# Patient Record
Sex: FEMALE | Race: WHITE | NOT HISPANIC OR LATINO
[De-identification: names, ages, dates, MRNs, and addresses within clinical notes are randomized per-mention and may not be internally consistent; named-entity substitution may affect disease eponyms.]

---

## 2019-06-20 ENCOUNTER — RECORD ABSTRACTING (OUTPATIENT)
Age: 29
End: 2019-06-20

## 2019-06-20 DIAGNOSIS — Z87.42 PERSONAL HISTORY OF OTHER DISEASES OF THE FEMALE GENITAL TRACT: ICD-10-CM

## 2019-06-20 PROBLEM — Z00.00 ENCOUNTER FOR PREVENTIVE HEALTH EXAMINATION: Status: ACTIVE | Noted: 2019-06-20

## 2019-06-20 LAB — CYTOLOGY CVX/VAG DOC THIN PREP: NORMAL

## 2020-03-26 ENCOUNTER — LABORATORY RESULT (OUTPATIENT)
Age: 30
End: 2020-03-26

## 2020-03-26 ENCOUNTER — APPOINTMENT (OUTPATIENT)
Dept: OBGYN | Facility: CLINIC | Age: 30
End: 2020-03-26
Payer: COMMERCIAL

## 2020-03-26 VITALS — WEIGHT: 159 LBS | SYSTOLIC BLOOD PRESSURE: 120 MMHG | DIASTOLIC BLOOD PRESSURE: 80 MMHG

## 2020-03-26 PROCEDURE — 0500F INITIAL PRENATAL CARE VISIT: CPT

## 2020-03-26 PROCEDURE — 36415 COLL VENOUS BLD VENIPUNCTURE: CPT

## 2020-03-26 PROCEDURE — 59425 ANTEPARTUM CARE ONLY: CPT

## 2020-03-26 NOTE — CHIEF COMPLAINT
[Initial Prenatal Visit] : initial prenatal visit [FreeTextEntry1] : Initial office visit.\par LMP:11/11/2019\par ZULLY:8/17/2020\par [id number]

## 2020-03-26 NOTE — HISTORY OF PRESENT ILLNESS
[Definite:  ___ (Date)] : the last menstrual period was [unfilled] [Normal Amount/Duration] : was of a normal amount and duration [Regular Cycle Intervals] : periods have been regular [Menarche Age: ____] : age at menarche was [unfilled] [Menstrual Cramps] : menstrual cramps [Spotting Between  Menses] : no spotting between menses [On BCP at conception] : the patient was not on BCP at conception [Contraception] : does not use contraception

## 2020-03-27 ENCOUNTER — LABORATORY RESULT (OUTPATIENT)
Age: 30
End: 2020-03-27

## 2020-03-30 ENCOUNTER — TRANSCRIPTION ENCOUNTER (OUTPATIENT)
Age: 30
End: 2020-03-30

## 2020-03-30 LAB
BACTERIA UR CULT: NORMAL
BASOPHILS # BLD AUTO: 0.04 K/UL
BASOPHILS NFR BLD AUTO: 0.5 %
C TRACH RRNA SPEC QL NAA+PROBE: NOT DETECTED
CMV IGG SERPL QL: <0.2 U/ML
CMV IGG SERPL-IMP: NEGATIVE
CMV IGM SERPL QL: <8 AU/ML
CMV IGM SERPL QL: NEGATIVE
EOSINOPHIL # BLD AUTO: 0.03 K/UL
EOSINOPHIL NFR BLD AUTO: 0.4 %
HBV SURFACE AG SER QL: NONREACTIVE
HCT VFR BLD CALC: 43.9 %
HCV AB SER QL: NONREACTIVE
HCV S/CO RATIO: 0.1 S/CO
HGB BLD-MCNC: 12.6 G/DL
HIV1+2 AB SPEC QL IA.RAPID: NONREACTIVE
HSV 1+2 IGG SER IA-IMP: NEGATIVE
HSV 1+2 IGG SER IA-IMP: POSITIVE
HSV1 IGG SER QL: >62.2 INDEX
HSV2 IGG SER QL: 0.13 INDEX
IMM GRANULOCYTES NFR BLD AUTO: 0.4 %
LYMPHOCYTES # BLD AUTO: 1.82 K/UL
LYMPHOCYTES NFR BLD AUTO: 21.5 %
MAN DIFF?: NORMAL
MCHC RBC-ENTMCNC: 28.7 GM/DL
MCHC RBC-ENTMCNC: 31.7 PG
MCV RBC AUTO: 110.6 FL
MEV IGG FLD QL IA: >300 AU/ML
MEV IGG+IGM SER-IMP: POSITIVE
MONOCYTES # BLD AUTO: 0.48 K/UL
MONOCYTES NFR BLD AUTO: 5.7 %
N GONORRHOEA RRNA SPEC QL NAA+PROBE: NOT DETECTED
NEUTROPHILS # BLD AUTO: 6.08 K/UL
NEUTROPHILS NFR BLD AUTO: 71.5 %
PLATELET # BLD AUTO: 293 K/UL
RBC # BLD: 3.97 M/UL
RBC # FLD: 15.1 %
RUBV IGG FLD-ACNC: 2.9 INDEX
RUBV IGG SER-IMP: POSITIVE
SOURCE AMPLIFICATION: NORMAL
T GONDII AB SER-IMP: NEGATIVE
T GONDII AB SER-IMP: NEGATIVE
T GONDII IGG SER QL: <3 IU/ML
T GONDII IGM SER QL: <3 AU/ML
T PALLIDUM AB SER QL IA: NEGATIVE
VZV AB TITR SER: POSITIVE
VZV IGG SER IF-ACNC: 1360 INDEX
WBC # FLD AUTO: 8.48 K/UL

## 2020-03-31 ENCOUNTER — APPOINTMENT (OUTPATIENT)
Dept: OBGYN | Facility: CLINIC | Age: 30
End: 2020-03-31

## 2020-03-31 ENCOUNTER — APPOINTMENT (OUTPATIENT)
Dept: ANTEPARTUM | Facility: CLINIC | Age: 30
End: 2020-03-31
Payer: COMMERCIAL

## 2020-03-31 PROCEDURE — 76946 ECHO GUIDE FOR AMNIOCENTESIS: CPT

## 2020-03-31 PROCEDURE — 59000 AMNIOCENTESIS DIAGNOSTIC: CPT

## 2020-04-01 LAB
B19V IGG SER QL IA: 0.2 INDEX
B19V IGG+IGM SER-IMP: NEGATIVE
B19V IGG+IGM SER-IMP: NORMAL
B19V IGM FLD-ACNC: 0.1 INDEX
B19V IGM SER-ACNC: NEGATIVE
HGB A MFR BLD: 97.4 %
HGB A2 MFR BLD: 2.6 %
HGB FRACT BLD-IMP: NORMAL
MEV IGM SER QL: NEGATIVE

## 2020-04-07 ENCOUNTER — APPOINTMENT (OUTPATIENT)
Dept: ANTEPARTUM | Facility: CLINIC | Age: 30
End: 2020-04-07
Payer: COMMERCIAL

## 2020-04-07 PROCEDURE — 76817 TRANSVAGINAL US OBSTETRIC: CPT

## 2020-04-07 PROCEDURE — 76805 OB US >/= 14 WKS SNGL FETUS: CPT

## 2020-04-17 ENCOUNTER — APPOINTMENT (OUTPATIENT)
Dept: OBGYN | Facility: CLINIC | Age: 30
End: 2020-04-17
Payer: COMMERCIAL

## 2020-04-17 VITALS — WEIGHT: 163 LBS | DIASTOLIC BLOOD PRESSURE: 60 MMHG | SYSTOLIC BLOOD PRESSURE: 100 MMHG

## 2020-04-17 PROCEDURE — 0502F SUBSEQUENT PRENATAL CARE: CPT

## 2020-04-27 DIAGNOSIS — O23.40 UNSPECIFIED INFECTION OF URINARY TRACT IN PREGNANCY, UNSPECIFIED TRIMESTER: ICD-10-CM

## 2020-04-30 ENCOUNTER — APPOINTMENT (OUTPATIENT)
Dept: OBGYN | Facility: CLINIC | Age: 30
End: 2020-04-30
Payer: COMMERCIAL

## 2020-04-30 VITALS — DIASTOLIC BLOOD PRESSURE: 70 MMHG | SYSTOLIC BLOOD PRESSURE: 100 MMHG | WEIGHT: 168 LBS

## 2020-04-30 PROCEDURE — 0502F SUBSEQUENT PRENATAL CARE: CPT

## 2020-05-04 LAB — BACTERIA UR CULT: NORMAL

## 2020-05-07 ENCOUNTER — APPOINTMENT (OUTPATIENT)
Dept: OBGYN | Facility: CLINIC | Age: 30
End: 2020-05-07
Payer: COMMERCIAL

## 2020-05-07 VITALS — WEIGHT: 169 LBS | DIASTOLIC BLOOD PRESSURE: 60 MMHG | SYSTOLIC BLOOD PRESSURE: 100 MMHG

## 2020-05-07 PROCEDURE — 36415 COLL VENOUS BLD VENIPUNCTURE: CPT

## 2020-05-07 PROCEDURE — 0502F SUBSEQUENT PRENATAL CARE: CPT

## 2020-05-08 LAB
BASOPHILS # BLD AUTO: 0.02 K/UL
BASOPHILS NFR BLD AUTO: 0.2 %
EOSINOPHIL # BLD AUTO: 0.04 K/UL
EOSINOPHIL NFR BLD AUTO: 0.4 %
GLUCOSE 1H P 50 G GLC PO SERPL-MCNC: 71 MG/DL
HCT VFR BLD CALC: 38.4 %
HGB BLD-MCNC: 11.9 G/DL
IMM GRANULOCYTES NFR BLD AUTO: 0.4 %
LYMPHOCYTES # BLD AUTO: 1.86 K/UL
LYMPHOCYTES NFR BLD AUTO: 19.3 %
MAN DIFF?: NORMAL
MCHC RBC-ENTMCNC: 31 GM/DL
MCHC RBC-ENTMCNC: 32 PG
MCV RBC AUTO: 103.2 FL
MONOCYTES # BLD AUTO: 0.76 K/UL
MONOCYTES NFR BLD AUTO: 7.9 %
NEUTROPHILS # BLD AUTO: 6.93 K/UL
NEUTROPHILS NFR BLD AUTO: 71.8 %
PLATELET # BLD AUTO: 237 K/UL
RBC # BLD: 3.72 M/UL
RBC # FLD: 13.4 %
WBC # FLD AUTO: 9.65 K/UL

## 2020-05-11 LAB — BACTERIA UR CULT: NORMAL

## 2020-05-25 RX ORDER — NITROFURANTOIN (MONOHYDRATE/MACROCRYSTALS) 25; 75 MG/1; MG/1
100 CAPSULE ORAL
Qty: 14 | Refills: 0 | Status: ACTIVE | COMMUNITY
Start: 2020-04-27 | End: 1900-01-01

## 2020-05-25 RX ORDER — TERCONAZOLE 4 MG/G
0.4 CREAM VAGINAL
Qty: 1 | Refills: 0 | Status: ACTIVE | COMMUNITY
Start: 2020-05-25 | End: 1900-01-01

## 2020-05-28 ENCOUNTER — APPOINTMENT (OUTPATIENT)
Dept: OBGYN | Facility: CLINIC | Age: 30
End: 2020-05-28
Payer: COMMERCIAL

## 2020-05-28 VITALS — WEIGHT: 174 LBS | SYSTOLIC BLOOD PRESSURE: 120 MMHG | DIASTOLIC BLOOD PRESSURE: 70 MMHG

## 2020-05-28 PROCEDURE — 0502F SUBSEQUENT PRENATAL CARE: CPT

## 2020-06-02 ENCOUNTER — APPOINTMENT (OUTPATIENT)
Dept: ANTEPARTUM | Facility: CLINIC | Age: 30
End: 2020-06-02
Payer: COMMERCIAL

## 2020-06-02 PROCEDURE — 76819 FETAL BIOPHYS PROFIL W/O NST: CPT

## 2020-06-02 PROCEDURE — 76816 OB US FOLLOW-UP PER FETUS: CPT

## 2020-06-11 ENCOUNTER — APPOINTMENT (OUTPATIENT)
Dept: OBGYN | Facility: CLINIC | Age: 30
End: 2020-06-11

## 2020-06-16 ENCOUNTER — APPOINTMENT (OUTPATIENT)
Dept: ANTEPARTUM | Facility: CLINIC | Age: 30
End: 2020-06-16

## 2021-04-30 ENCOUNTER — TRANSCRIPTION ENCOUNTER (OUTPATIENT)
Age: 31
End: 2021-04-30

## 2021-07-21 ENCOUNTER — TRANSCRIPTION ENCOUNTER (OUTPATIENT)
Age: 31
End: 2021-07-21

## 2021-07-29 ENCOUNTER — TRANSCRIPTION ENCOUNTER (OUTPATIENT)
Age: 31
End: 2021-07-29

## 2021-08-15 ENCOUNTER — TRANSCRIPTION ENCOUNTER (OUTPATIENT)
Age: 31
End: 2021-08-15

## 2021-08-29 ENCOUNTER — TRANSCRIPTION ENCOUNTER (OUTPATIENT)
Age: 31
End: 2021-08-29

## 2021-11-04 ENCOUNTER — TRANSCRIPTION ENCOUNTER (OUTPATIENT)
Age: 31
End: 2021-11-04

## 2021-12-16 ENCOUNTER — TRANSCRIPTION ENCOUNTER (OUTPATIENT)
Age: 31
End: 2021-12-16

## 2022-01-20 ENCOUNTER — TRANSCRIPTION ENCOUNTER (OUTPATIENT)
Age: 32
End: 2022-01-20

## 2022-02-18 ENCOUNTER — TRANSCRIPTION ENCOUNTER (OUTPATIENT)
Age: 32
End: 2022-02-18

## 2022-03-22 ENCOUNTER — TRANSCRIPTION ENCOUNTER (OUTPATIENT)
Age: 32
End: 2022-03-22

## 2022-05-24 ENCOUNTER — NON-APPOINTMENT (OUTPATIENT)
Age: 32
End: 2022-05-24

## 2022-06-07 ENCOUNTER — APPOINTMENT (OUTPATIENT)
Dept: OBGYN | Facility: CLINIC | Age: 32
End: 2022-06-07
Payer: COMMERCIAL

## 2022-06-07 ENCOUNTER — NON-APPOINTMENT (OUTPATIENT)
Age: 32
End: 2022-06-07

## 2022-06-07 ENCOUNTER — LABORATORY RESULT (OUTPATIENT)
Age: 32
End: 2022-06-07

## 2022-06-07 VITALS — WEIGHT: 169 LBS | HEART RATE: 77 BPM | SYSTOLIC BLOOD PRESSURE: 104 MMHG | DIASTOLIC BLOOD PRESSURE: 69 MMHG

## 2022-06-07 DIAGNOSIS — Z34.02 ENCOUNTER FOR SUPERVISION OF NORMAL FIRST PREGNANCY, SECOND TRIMESTER: ICD-10-CM

## 2022-06-07 PROCEDURE — 0500F INITIAL PRENATAL CARE VISIT: CPT

## 2022-06-07 RX ORDER — ESCITALOPRAM OXALATE 10 MG/1
10 TABLET ORAL
Qty: 60 | Refills: 0 | Status: ACTIVE | COMMUNITY
Start: 2021-12-22

## 2022-06-07 RX ORDER — BUPROPION HYDROCHLORIDE 150 MG/1
150 TABLET, FILM COATED, EXTENDED RELEASE ORAL
Qty: 30 | Refills: 0 | Status: ACTIVE | COMMUNITY
Start: 2022-04-14

## 2022-06-07 NOTE — HISTORY OF PRESENT ILLNESS
[Patient reported PAP Smear was normal] : Patient reported PAP Smear was normal [N] : Patient does not use contraception [Y] : Positive pregnancy history [Normal Amount/Duration] :  normal amount and duration [Currently Active] : currently active [Men] : men [No] : No [PapSmeardate] : 10/17 [LMPDate] : 04/11/2022 [PGxTotal] : 1 [[facility]] : 1 [FreeTextEntry1] : 04/11/2022

## 2022-06-09 LAB
ABO + RH PNL BLD: NORMAL
BACTERIA UR CULT: NORMAL
BASOPHILS # BLD AUTO: 0.06 K/UL
BASOPHILS NFR BLD AUTO: 0.8 %
BLD GP AB SCN SERPL QL: NORMAL
C TRACH RRNA SPEC QL NAA+PROBE: NOT DETECTED
CMV IGG SERPL QL: <0.2 U/ML
CMV IGG SERPL-IMP: NEGATIVE
CMV IGM SERPL QL: <8 AU/ML
CMV IGM SERPL QL: NEGATIVE
EOSINOPHIL # BLD AUTO: 0.39 K/UL
EOSINOPHIL NFR BLD AUTO: 5.2 %
HBV SURFACE AG SER QL: NONREACTIVE
HCT VFR BLD CALC: 41.8 %
HCV AB SER QL: NONREACTIVE
HCV S/CO RATIO: 0.15 S/CO
HGB A MFR BLD: 97 %
HGB A2 MFR BLD: 2.6 %
HGB BLD-MCNC: 13.1 G/DL
HGB F MFR BLD: 0.4 %
HGB FRACT BLD-IMP: NORMAL
HIV1+2 AB SPEC QL IA.RAPID: NONREACTIVE
HSV 1+2 IGG SER IA-IMP: NEGATIVE
HSV 1+2 IGG SER IA-IMP: POSITIVE
HSV1 IGG SER QL: 56.5 INDEX
HSV2 IGG SER QL: 0.46 INDEX
IMM GRANULOCYTES NFR BLD AUTO: 0.3 %
LYMPHOCYTES # BLD AUTO: 2.23 K/UL
LYMPHOCYTES NFR BLD AUTO: 29.6 %
MAN DIFF?: NORMAL
MCHC RBC-ENTMCNC: 30.3 PG
MCHC RBC-ENTMCNC: 31.3 GM/DL
MCV RBC AUTO: 96.5 FL
MEV IGG FLD QL IA: >300 AU/ML
MEV IGG+IGM SER-IMP: POSITIVE
MONOCYTES # BLD AUTO: 0.47 K/UL
MONOCYTES NFR BLD AUTO: 6.2 %
N GONORRHOEA RRNA SPEC QL NAA+PROBE: NOT DETECTED
NEUTROPHILS # BLD AUTO: 4.36 K/UL
NEUTROPHILS NFR BLD AUTO: 57.9 %
PLATELET # BLD AUTO: 296 K/UL
RBC # BLD: 4.33 M/UL
RBC # FLD: 14.2 %
RUBV IGG FLD-ACNC: 1.7 INDEX
RUBV IGG SER-IMP: POSITIVE
RUBV IGM FLD-ACNC: <20 AU/ML
SOURCE AMPLIFICATION: NORMAL
T GONDII AB SER-IMP: NEGATIVE
T GONDII AB SER-IMP: NEGATIVE
T GONDII IGG SER QL: <3 IU/ML
T GONDII IGM SER QL: <3 AU/ML
T PALLIDUM AB SER QL IA: NEGATIVE
TSH SERPL-ACNC: 2.3 UIU/ML
VZV AB TITR SER: POSITIVE
VZV IGG SER IF-ACNC: 1604 INDEX
VZV IGM SER IF-ACNC: <0.91 INDEX
WBC # FLD AUTO: 7.53 K/UL

## 2022-06-11 LAB
B19V IGG SER QL IA: 0.32 INDEX
B19V IGG+IGM SER-IMP: NEGATIVE
B19V IGG+IGM SER-IMP: NORMAL
B19V IGM FLD-ACNC: 0.07 INDEX
B19V IGM SER-ACNC: NEGATIVE
CYTOLOGY CVX/VAG DOC THIN PREP: NORMAL

## 2022-06-13 LAB — MEV IGM SER QL: <0.91 ISR

## 2022-06-16 LAB
HSV1 IGM SER QL: NEGATIVE
HSV2 AB FLD-ACNC: NEGATIVE

## 2022-06-17 ENCOUNTER — NON-APPOINTMENT (OUTPATIENT)
Age: 32
End: 2022-06-17

## 2022-06-27 ENCOUNTER — APPOINTMENT (OUTPATIENT)
Dept: OBGYN | Facility: CLINIC | Age: 32
End: 2022-06-27
Payer: COMMERCIAL

## 2022-06-27 VITALS
OXYGEN SATURATION: 97 % | SYSTOLIC BLOOD PRESSURE: 104 MMHG | HEART RATE: 100 BPM | WEIGHT: 166 LBS | DIASTOLIC BLOOD PRESSURE: 68 MMHG

## 2022-06-27 PROCEDURE — 0500F INITIAL PRENATAL CARE VISIT: CPT

## 2022-06-28 ENCOUNTER — NON-APPOINTMENT (OUTPATIENT)
Age: 32
End: 2022-06-28

## 2022-07-01 ENCOUNTER — NON-APPOINTMENT (OUTPATIENT)
Age: 32
End: 2022-07-01

## 2022-07-04 ENCOUNTER — NON-APPOINTMENT (OUTPATIENT)
Age: 32
End: 2022-07-04

## 2022-07-07 ENCOUNTER — ASOB RESULT (OUTPATIENT)
Age: 32
End: 2022-07-07

## 2022-07-07 ENCOUNTER — APPOINTMENT (OUTPATIENT)
Dept: ANTEPARTUM | Facility: CLINIC | Age: 32
End: 2022-07-07

## 2022-07-07 PROCEDURE — 76818 FETAL BIOPHYS PROFILE W/NST: CPT

## 2022-07-07 PROCEDURE — 76816 OB US FOLLOW-UP PER FETUS: CPT

## 2022-07-13 ENCOUNTER — APPOINTMENT (OUTPATIENT)
Dept: ANTEPARTUM | Facility: CLINIC | Age: 32
End: 2022-07-13

## 2022-07-13 ENCOUNTER — ASOB RESULT (OUTPATIENT)
Age: 32
End: 2022-07-13

## 2022-07-13 PROCEDURE — 59015 CHORION BIOPSY: CPT

## 2022-07-13 PROCEDURE — 76945 ECHO GUIDE VILLUS SAMPLING: CPT

## 2022-07-18 ENCOUNTER — APPOINTMENT (OUTPATIENT)
Dept: OBGYN | Facility: CLINIC | Age: 32
End: 2022-07-18

## 2022-07-18 VITALS
SYSTOLIC BLOOD PRESSURE: 96 MMHG | DIASTOLIC BLOOD PRESSURE: 63 MMHG | WEIGHT: 171.63 LBS | HEART RATE: 78 BPM | OXYGEN SATURATION: 100 %

## 2022-07-18 PROCEDURE — 0502F SUBSEQUENT PRENATAL CARE: CPT

## 2022-08-09 ENCOUNTER — ASOB RESULT (OUTPATIENT)
Age: 32
End: 2022-08-09

## 2022-08-09 ENCOUNTER — NON-APPOINTMENT (OUTPATIENT)
Age: 32
End: 2022-08-09

## 2022-08-09 ENCOUNTER — APPOINTMENT (OUTPATIENT)
Dept: ANTEPARTUM | Facility: CLINIC | Age: 32
End: 2022-08-09

## 2022-08-09 PROCEDURE — 76811 OB US DETAILED SNGL FETUS: CPT

## 2022-08-09 PROCEDURE — 76817 TRANSVAGINAL US OBSTETRIC: CPT | Mod: 59

## 2022-08-16 ENCOUNTER — NON-APPOINTMENT (OUTPATIENT)
Age: 32
End: 2022-08-16

## 2022-08-16 ENCOUNTER — APPOINTMENT (OUTPATIENT)
Dept: OBGYN | Facility: CLINIC | Age: 32
End: 2022-08-16

## 2022-08-16 VITALS — WEIGHT: 171 LBS | DIASTOLIC BLOOD PRESSURE: 62 MMHG | SYSTOLIC BLOOD PRESSURE: 101 MMHG

## 2022-08-16 PROCEDURE — 0502F SUBSEQUENT PRENATAL CARE: CPT

## 2022-09-08 ENCOUNTER — ASOB RESULT (OUTPATIENT)
Age: 32
End: 2022-09-08

## 2022-09-08 ENCOUNTER — APPOINTMENT (OUTPATIENT)
Dept: ANTEPARTUM | Facility: CLINIC | Age: 32
End: 2022-09-08

## 2022-09-08 PROCEDURE — 76816 OB US FOLLOW-UP PER FETUS: CPT

## 2022-09-13 ENCOUNTER — APPOINTMENT (OUTPATIENT)
Dept: OBGYN | Facility: CLINIC | Age: 32
End: 2022-09-13

## 2022-09-13 VITALS — SYSTOLIC BLOOD PRESSURE: 109 MMHG | WEIGHT: 176 LBS | DIASTOLIC BLOOD PRESSURE: 71 MMHG

## 2022-09-13 PROCEDURE — 0502F SUBSEQUENT PRENATAL CARE: CPT

## 2022-10-03 ENCOUNTER — NON-APPOINTMENT (OUTPATIENT)
Age: 32
End: 2022-10-03

## 2022-10-03 ENCOUNTER — APPOINTMENT (OUTPATIENT)
Dept: OBGYN | Facility: CLINIC | Age: 32
End: 2022-10-03

## 2022-10-03 VITALS — DIASTOLIC BLOOD PRESSURE: 69 MMHG | SYSTOLIC BLOOD PRESSURE: 104 MMHG | OXYGEN SATURATION: 99 % | WEIGHT: 183 LBS

## 2022-10-03 DIAGNOSIS — Z23 ENCOUNTER FOR IMMUNIZATION: ICD-10-CM

## 2022-10-03 PROCEDURE — 0502F SUBSEQUENT PRENATAL CARE: CPT

## 2022-10-04 LAB
BASOPHILS # BLD AUTO: 0.03 K/UL
BASOPHILS NFR BLD AUTO: 0.3 %
EOSINOPHIL # BLD AUTO: 0.23 K/UL
EOSINOPHIL NFR BLD AUTO: 2.4 %
ESTIMATED AVERAGE GLUCOSE: 85 MG/DL
GLUCOSE 1H P 50 G GLC PO SERPL-MCNC: 100 MG/DL
HBA1C MFR BLD HPLC: 4.6 %
HCT VFR BLD CALC: 38.7 %
HGB BLD-MCNC: 12.2 G/DL
IMM GRANULOCYTES NFR BLD AUTO: 0.4 %
LYMPHOCYTES # BLD AUTO: 3 K/UL
LYMPHOCYTES NFR BLD AUTO: 31.6 %
MAN DIFF?: NORMAL
MCHC RBC-ENTMCNC: 31.5 GM/DL
MCHC RBC-ENTMCNC: 32 PG
MCV RBC AUTO: 101.6 FL
MONOCYTES # BLD AUTO: 0.51 K/UL
MONOCYTES NFR BLD AUTO: 5.4 %
NEUTROPHILS # BLD AUTO: 5.67 K/UL
NEUTROPHILS NFR BLD AUTO: 59.9 %
PLATELET # BLD AUTO: 222 K/UL
RBC # BLD: 3.81 M/UL
RBC # FLD: 13.9 %
WBC # FLD AUTO: 9.48 K/UL

## 2022-10-06 LAB — BACTERIA UR CULT: NORMAL

## 2022-10-20 ENCOUNTER — APPOINTMENT (OUTPATIENT)
Dept: OBGYN | Facility: CLINIC | Age: 32
End: 2022-10-20

## 2022-10-20 VITALS
WEIGHT: 184 LBS | HEIGHT: 69 IN | SYSTOLIC BLOOD PRESSURE: 104 MMHG | OXYGEN SATURATION: 100 % | BODY MASS INDEX: 27.25 KG/M2 | DIASTOLIC BLOOD PRESSURE: 71 MMHG

## 2022-10-20 PROCEDURE — 0502F SUBSEQUENT PRENATAL CARE: CPT

## 2022-10-27 ENCOUNTER — APPOINTMENT (OUTPATIENT)
Dept: ANTEPARTUM | Facility: CLINIC | Age: 32
End: 2022-10-27

## 2022-10-27 ENCOUNTER — ASOB RESULT (OUTPATIENT)
Age: 32
End: 2022-10-27

## 2022-10-27 ENCOUNTER — NON-APPOINTMENT (OUTPATIENT)
Age: 32
End: 2022-10-27

## 2022-10-27 PROCEDURE — 76819 FETAL BIOPHYS PROFIL W/O NST: CPT

## 2022-11-07 ENCOUNTER — APPOINTMENT (OUTPATIENT)
Dept: OBGYN | Facility: CLINIC | Age: 32
End: 2022-11-07

## 2022-11-07 ENCOUNTER — NON-APPOINTMENT (OUTPATIENT)
Age: 32
End: 2022-11-07

## 2022-11-07 VITALS
OXYGEN SATURATION: 98 % | DIASTOLIC BLOOD PRESSURE: 63 MMHG | WEIGHT: 192.75 LBS | SYSTOLIC BLOOD PRESSURE: 99 MMHG | BODY MASS INDEX: 28.46 KG/M2

## 2022-11-07 PROCEDURE — 0502F SUBSEQUENT PRENATAL CARE: CPT

## 2022-11-21 ENCOUNTER — NON-APPOINTMENT (OUTPATIENT)
Age: 32
End: 2022-11-21

## 2022-11-22 ENCOUNTER — NON-APPOINTMENT (OUTPATIENT)
Age: 32
End: 2022-11-22

## 2022-11-22 ENCOUNTER — APPOINTMENT (OUTPATIENT)
Dept: OBGYN | Facility: CLINIC | Age: 32
End: 2022-11-22

## 2022-11-22 VITALS
BODY MASS INDEX: 28.58 KG/M2 | SYSTOLIC BLOOD PRESSURE: 106 MMHG | DIASTOLIC BLOOD PRESSURE: 74 MMHG | HEIGHT: 69 IN | OXYGEN SATURATION: 99 % | WEIGHT: 193 LBS

## 2022-11-22 PROCEDURE — 0502F SUBSEQUENT PRENATAL CARE: CPT

## 2022-11-23 ENCOUNTER — APPOINTMENT (OUTPATIENT)
Dept: OBGYN | Facility: CLINIC | Age: 32
End: 2022-11-23

## 2022-11-23 ENCOUNTER — ASOB RESULT (OUTPATIENT)
Age: 32
End: 2022-11-23

## 2022-11-23 ENCOUNTER — APPOINTMENT (OUTPATIENT)
Dept: ANTEPARTUM | Facility: CLINIC | Age: 32
End: 2022-11-23

## 2022-11-23 PROCEDURE — 76816 OB US FOLLOW-UP PER FETUS: CPT

## 2022-11-23 PROCEDURE — 76819 FETAL BIOPHYS PROFIL W/O NST: CPT

## 2022-12-06 ENCOUNTER — APPOINTMENT (OUTPATIENT)
Dept: OBGYN | Facility: CLINIC | Age: 32
End: 2022-12-06

## 2022-12-06 VITALS
WEIGHT: 198 LBS | HEART RATE: 91 BPM | BODY MASS INDEX: 29.24 KG/M2 | SYSTOLIC BLOOD PRESSURE: 112 MMHG | DIASTOLIC BLOOD PRESSURE: 72 MMHG | OXYGEN SATURATION: 100 %

## 2022-12-06 PROCEDURE — 0502F SUBSEQUENT PRENATAL CARE: CPT

## 2022-12-20 ENCOUNTER — APPOINTMENT (OUTPATIENT)
Dept: OBGYN | Facility: CLINIC | Age: 32
End: 2022-12-20

## 2022-12-22 ENCOUNTER — ASOB RESULT (OUTPATIENT)
Age: 32
End: 2022-12-22

## 2022-12-22 ENCOUNTER — APPOINTMENT (OUTPATIENT)
Dept: OBGYN | Facility: CLINIC | Age: 32
End: 2022-12-22

## 2022-12-22 ENCOUNTER — APPOINTMENT (OUTPATIENT)
Dept: ANTEPARTUM | Facility: CLINIC | Age: 32
End: 2022-12-22

## 2022-12-22 VITALS
BODY MASS INDEX: 29.83 KG/M2 | DIASTOLIC BLOOD PRESSURE: 77 MMHG | WEIGHT: 202 LBS | OXYGEN SATURATION: 98 % | SYSTOLIC BLOOD PRESSURE: 124 MMHG

## 2022-12-22 PROCEDURE — 76816 OB US FOLLOW-UP PER FETUS: CPT

## 2022-12-22 PROCEDURE — 0502F SUBSEQUENT PRENATAL CARE: CPT

## 2022-12-22 PROCEDURE — 76819 FETAL BIOPHYS PROFIL W/O NST: CPT

## 2022-12-23 LAB
BASOPHILS # BLD AUTO: 0.05 K/UL
BASOPHILS NFR BLD AUTO: 0.4 %
EOSINOPHIL # BLD AUTO: 0.15 K/UL
EOSINOPHIL NFR BLD AUTO: 1.2 %
HCT VFR BLD CALC: 39.4 %
HGB BLD-MCNC: 12.5 G/DL
IMM GRANULOCYTES NFR BLD AUTO: 1 %
LYMPHOCYTES # BLD AUTO: 3.5 K/UL
LYMPHOCYTES NFR BLD AUTO: 29 %
MAN DIFF?: NORMAL
MCHC RBC-ENTMCNC: 31.5 PG
MCHC RBC-ENTMCNC: 31.7 GM/DL
MCV RBC AUTO: 99.2 FL
MONOCYTES # BLD AUTO: 0.77 K/UL
MONOCYTES NFR BLD AUTO: 6.4 %
NEUTROPHILS # BLD AUTO: 7.48 K/UL
NEUTROPHILS NFR BLD AUTO: 62 %
PLATELET # BLD AUTO: 220 K/UL
RBC # BLD: 3.97 M/UL
RBC # FLD: 14.7 %
WBC # FLD AUTO: 12.07 K/UL

## 2022-12-27 ENCOUNTER — TRANSCRIPTION ENCOUNTER (OUTPATIENT)
Age: 32
End: 2022-12-27

## 2022-12-27 LAB
GP B STREP DNA SPEC QL NAA+PROBE: NOT DETECTED
SOURCE GBS: NORMAL

## 2022-12-29 ENCOUNTER — APPOINTMENT (OUTPATIENT)
Dept: OBGYN | Facility: CLINIC | Age: 32
End: 2022-12-29

## 2022-12-29 VITALS
HEIGHT: 69 IN | DIASTOLIC BLOOD PRESSURE: 85 MMHG | BODY MASS INDEX: 29.77 KG/M2 | WEIGHT: 201 LBS | OXYGEN SATURATION: 98 % | SYSTOLIC BLOOD PRESSURE: 123 MMHG

## 2022-12-29 PROCEDURE — 0502F SUBSEQUENT PRENATAL CARE: CPT

## 2023-01-03 ENCOUNTER — APPOINTMENT (OUTPATIENT)
Dept: OBGYN | Facility: CLINIC | Age: 33
End: 2023-01-03
Payer: COMMERCIAL

## 2023-01-03 VITALS
SYSTOLIC BLOOD PRESSURE: 105 MMHG | OXYGEN SATURATION: 98 % | WEIGHT: 206.13 LBS | DIASTOLIC BLOOD PRESSURE: 46 MMHG | BODY MASS INDEX: 30.44 KG/M2

## 2023-01-03 PROCEDURE — 0502F SUBSEQUENT PRENATAL CARE: CPT

## 2023-01-06 ENCOUNTER — APPOINTMENT (OUTPATIENT)
Dept: ANTEPARTUM | Facility: CLINIC | Age: 33
End: 2023-01-06
Payer: COMMERCIAL

## 2023-01-06 ENCOUNTER — ASOB RESULT (OUTPATIENT)
Age: 33
End: 2023-01-06

## 2023-01-06 PROCEDURE — 76816 OB US FOLLOW-UP PER FETUS: CPT

## 2023-01-06 PROCEDURE — 76819 FETAL BIOPHYS PROFIL W/O NST: CPT

## 2023-01-10 ENCOUNTER — APPOINTMENT (OUTPATIENT)
Dept: OBGYN | Facility: CLINIC | Age: 33
End: 2023-01-10
Payer: COMMERCIAL

## 2023-01-10 ENCOUNTER — NON-APPOINTMENT (OUTPATIENT)
Age: 33
End: 2023-01-10

## 2023-01-10 VITALS
BODY MASS INDEX: 30.31 KG/M2 | SYSTOLIC BLOOD PRESSURE: 101 MMHG | DIASTOLIC BLOOD PRESSURE: 63 MMHG | WEIGHT: 205.25 LBS

## 2023-01-10 PROCEDURE — 0502F SUBSEQUENT PRENATAL CARE: CPT

## 2023-01-11 ENCOUNTER — OUTPATIENT (OUTPATIENT)
Dept: OUTPATIENT SERVICES | Facility: HOSPITAL | Age: 33
LOS: 1 days | End: 2023-01-11
Payer: COMMERCIAL

## 2023-01-11 ENCOUNTER — TRANSCRIPTION ENCOUNTER (OUTPATIENT)
Age: 33
End: 2023-01-11

## 2023-01-11 ENCOUNTER — NON-APPOINTMENT (OUTPATIENT)
Age: 33
End: 2023-01-11

## 2023-01-11 DIAGNOSIS — Z3A.00 WEEKS OF GESTATION OF PREGNANCY NOT SPECIFIED: ICD-10-CM

## 2023-01-11 DIAGNOSIS — O26.899 OTHER SPECIFIED PREGNANCY RELATED CONDITIONS, UNSPECIFIED TRIMESTER: ICD-10-CM

## 2023-01-11 PROCEDURE — 99214 OFFICE O/P EST MOD 30 MIN: CPT

## 2023-01-11 PROCEDURE — 99213 OFFICE O/P EST LOW 20 MIN: CPT

## 2023-01-12 ENCOUNTER — INPATIENT (INPATIENT)
Facility: HOSPITAL | Age: 33
LOS: 2 days | Discharge: HOME | End: 2023-01-15
Attending: OBSTETRICS & GYNECOLOGY | Admitting: OBSTETRICS & GYNECOLOGY
Payer: COMMERCIAL

## 2023-01-12 VITALS — HEIGHT: 69 IN | WEIGHT: 203.93 LBS

## 2023-01-12 LAB
BASOPHILS # BLD AUTO: 0.04 K/UL — SIGNIFICANT CHANGE UP (ref 0–0.2)
BASOPHILS NFR BLD AUTO: 0.3 % — SIGNIFICANT CHANGE UP (ref 0–2)
BLD GP AB SCN SERPL QL: NEGATIVE — SIGNIFICANT CHANGE UP
COVID-19 SPIKE DOMAIN AB INTERP: POSITIVE
COVID-19 SPIKE DOMAIN ANTIBODY RESULT: >250 U/ML — HIGH
EOSINOPHIL # BLD AUTO: 0.15 K/UL — SIGNIFICANT CHANGE UP (ref 0–0.5)
EOSINOPHIL NFR BLD AUTO: 1.3 % — SIGNIFICANT CHANGE UP (ref 0–6)
HCT VFR BLD CALC: 35.9 % — SIGNIFICANT CHANGE UP (ref 34.5–45)
HGB BLD-MCNC: 11.9 G/DL — SIGNIFICANT CHANGE UP (ref 11.5–15.5)
IMM GRANULOCYTES NFR BLD AUTO: 0.8 % — SIGNIFICANT CHANGE UP (ref 0–0.9)
LYMPHOCYTES # BLD AUTO: 26.2 % — SIGNIFICANT CHANGE UP (ref 13–44)
LYMPHOCYTES # BLD AUTO: 3.04 K/UL — SIGNIFICANT CHANGE UP (ref 1–3.3)
MCHC RBC-ENTMCNC: 31.3 PG — SIGNIFICANT CHANGE UP (ref 27–34)
MCHC RBC-ENTMCNC: 33.1 GM/DL — SIGNIFICANT CHANGE UP (ref 32–36)
MCV RBC AUTO: 94.5 FL — SIGNIFICANT CHANGE UP (ref 80–100)
MONOCYTES # BLD AUTO: 0.7 K/UL — SIGNIFICANT CHANGE UP (ref 0–0.9)
MONOCYTES NFR BLD AUTO: 6 % — SIGNIFICANT CHANGE UP (ref 2–14)
NEUTROPHILS # BLD AUTO: 7.57 K/UL — HIGH (ref 1.8–7.4)
NEUTROPHILS NFR BLD AUTO: 65.4 % — SIGNIFICANT CHANGE UP (ref 43–77)
NRBC # BLD: 0 /100 WBCS — SIGNIFICANT CHANGE UP (ref 0–0)
PLATELET # BLD AUTO: 205 K/UL — SIGNIFICANT CHANGE UP (ref 150–400)
RBC # BLD: 3.8 M/UL — SIGNIFICANT CHANGE UP (ref 3.8–5.2)
RBC # FLD: 14.4 % — SIGNIFICANT CHANGE UP (ref 10.3–14.5)
RH IG SCN BLD-IMP: POSITIVE — SIGNIFICANT CHANGE UP
RH IG SCN BLD-IMP: POSITIVE — SIGNIFICANT CHANGE UP
SARS-COV-2 IGG+IGM SERPL QL IA: >250 U/ML — HIGH
SARS-COV-2 IGG+IGM SERPL QL IA: POSITIVE
SARS-COV-2 RNA SPEC QL NAA+PROBE: NEGATIVE — SIGNIFICANT CHANGE UP
T PALLIDUM AB TITR SER: NEGATIVE — SIGNIFICANT CHANGE UP
WBC # BLD: 11.59 K/UL — HIGH (ref 3.8–10.5)
WBC # FLD AUTO: 11.59 K/UL — HIGH (ref 3.8–10.5)

## 2023-01-12 PROCEDURE — 59510 CESAREAN DELIVERY: CPT

## 2023-01-12 RX ORDER — MAGNESIUM HYDROXIDE 400 MG/1
30 TABLET, CHEWABLE ORAL
Refills: 0 | Status: DISCONTINUED | OUTPATIENT
Start: 2023-01-12 | End: 2023-01-15

## 2023-01-12 RX ORDER — CEFAZOLIN SODIUM 1 G
2000 VIAL (EA) INJECTION ONCE
Refills: 0 | Status: COMPLETED | OUTPATIENT
Start: 2023-01-12 | End: 2023-01-12

## 2023-01-12 RX ORDER — SIMETHICONE 80 MG/1
80 TABLET, CHEWABLE ORAL EVERY 4 HOURS
Refills: 0 | Status: DISCONTINUED | OUTPATIENT
Start: 2023-01-12 | End: 2023-01-15

## 2023-01-12 RX ORDER — CITRIC ACID/SODIUM CITRATE 300-500 MG
30 SOLUTION, ORAL ORAL ONCE
Refills: 0 | Status: COMPLETED | OUTPATIENT
Start: 2023-01-12 | End: 2023-01-12

## 2023-01-12 RX ORDER — DIPHENHYDRAMINE HCL 50 MG
25 CAPSULE ORAL EVERY 6 HOURS
Refills: 0 | Status: DISCONTINUED | OUTPATIENT
Start: 2023-01-12 | End: 2023-01-15

## 2023-01-12 RX ORDER — OXYCODONE HYDROCHLORIDE 5 MG/1
5 TABLET ORAL
Refills: 0 | Status: DISCONTINUED | OUTPATIENT
Start: 2023-01-12 | End: 2023-01-15

## 2023-01-12 RX ORDER — OXYTOCIN 10 UNIT/ML
333.33 VIAL (ML) INJECTION
Qty: 20 | Refills: 0 | Status: DISCONTINUED | OUTPATIENT
Start: 2023-01-12 | End: 2023-01-15

## 2023-01-12 RX ORDER — CITRIC ACID/SODIUM CITRATE 300-500 MG
15 SOLUTION, ORAL ORAL EVERY 6 HOURS
Refills: 0 | Status: DISCONTINUED | OUTPATIENT
Start: 2023-01-12 | End: 2023-01-12

## 2023-01-12 RX ORDER — KETOROLAC TROMETHAMINE 30 MG/ML
30 SYRINGE (ML) INJECTION EVERY 6 HOURS
Refills: 0 | Status: DISCONTINUED | OUTPATIENT
Start: 2023-01-12 | End: 2023-01-13

## 2023-01-12 RX ORDER — IBUPROFEN 200 MG
600 TABLET ORAL EVERY 6 HOURS
Refills: 0 | Status: ACTIVE | OUTPATIENT
Start: 2023-01-12 | End: 2023-12-11

## 2023-01-12 RX ORDER — SODIUM CHLORIDE 9 MG/ML
1000 INJECTION, SOLUTION INTRAVENOUS
Refills: 0 | Status: DISCONTINUED | OUTPATIENT
Start: 2023-01-12 | End: 2023-01-15

## 2023-01-12 RX ORDER — CHLORHEXIDINE GLUCONATE 213 G/1000ML
1 SOLUTION TOPICAL ONCE
Refills: 0 | Status: COMPLETED | OUTPATIENT
Start: 2023-01-12 | End: 2023-01-12

## 2023-01-12 RX ORDER — SODIUM CHLORIDE 9 MG/ML
1000 INJECTION, SOLUTION INTRAVENOUS
Refills: 0 | Status: DISCONTINUED | OUTPATIENT
Start: 2023-01-12 | End: 2023-01-12

## 2023-01-12 RX ORDER — OXYCODONE HYDROCHLORIDE 5 MG/1
5 TABLET ORAL ONCE
Refills: 0 | Status: DISCONTINUED | OUTPATIENT
Start: 2023-01-12 | End: 2023-01-15

## 2023-01-12 RX ORDER — ONDANSETRON 8 MG/1
4 TABLET, FILM COATED ORAL EVERY 6 HOURS
Refills: 0 | Status: DISCONTINUED | OUTPATIENT
Start: 2023-01-12 | End: 2023-01-12

## 2023-01-12 RX ORDER — DEXAMETHASONE 0.5 MG/5ML
4 ELIXIR ORAL EVERY 6 HOURS
Refills: 0 | Status: DISCONTINUED | OUTPATIENT
Start: 2023-01-12 | End: 2023-01-12

## 2023-01-12 RX ORDER — LANOLIN
1 OINTMENT (GRAM) TOPICAL EVERY 6 HOURS
Refills: 0 | Status: DISCONTINUED | OUTPATIENT
Start: 2023-01-12 | End: 2023-01-15

## 2023-01-12 RX ORDER — NALOXONE HYDROCHLORIDE 4 MG/.1ML
0.1 SPRAY NASAL
Refills: 0 | Status: DISCONTINUED | OUTPATIENT
Start: 2023-01-12 | End: 2023-01-12

## 2023-01-12 RX ORDER — OXYTOCIN 10 UNIT/ML
333.33 VIAL (ML) INJECTION
Qty: 20 | Refills: 0 | Status: DISCONTINUED | OUTPATIENT
Start: 2023-01-12 | End: 2023-01-12

## 2023-01-12 RX ORDER — ACETAMINOPHEN 500 MG
975 TABLET ORAL
Refills: 0 | Status: DISCONTINUED | OUTPATIENT
Start: 2023-01-12 | End: 2023-01-15

## 2023-01-12 RX ORDER — TETANUS TOXOID, REDUCED DIPHTHERIA TOXOID AND ACELLULAR PERTUSSIS VACCINE, ADSORBED 5; 2.5; 8; 8; 2.5 [IU]/.5ML; [IU]/.5ML; UG/.5ML; UG/.5ML; UG/.5ML
0.5 SUSPENSION INTRAMUSCULAR ONCE
Refills: 0 | Status: DISCONTINUED | OUTPATIENT
Start: 2023-01-12 | End: 2023-01-15

## 2023-01-12 RX ADMIN — Medication 975 MILLIGRAM(S): at 22:50

## 2023-01-12 RX ADMIN — Medication 30 MILLIGRAM(S): at 21:15

## 2023-01-12 RX ADMIN — SODIUM CHLORIDE 125 MILLILITER(S): 9 INJECTION, SOLUTION INTRAVENOUS at 13:53

## 2023-01-12 RX ADMIN — CHLORHEXIDINE GLUCONATE 1 APPLICATION(S): 213 SOLUTION TOPICAL at 17:09

## 2023-01-12 RX ADMIN — Medication 30 MILLILITER(S): at 18:07

## 2023-01-12 RX ADMIN — Medication 100 MILLIGRAM(S): at 18:12

## 2023-01-12 NOTE — PATIENT PROFILE OB - FALL HARM RISK - UNIVERSAL INTERVENTIONS
Bed in lowest position, wheels locked, appropriate side rails in place/Call bell, personal items and telephone in reach/Instruct patient to call for assistance before getting out of bed or chair/Non-slip footwear when patient is out of bed/[place] to call system/Physically safe environment - no spills, clutter or unnecessary equipment/Purposeful Proactive Rounding/Room/bathroom lighting operational, light cord in reach

## 2023-01-12 NOTE — PRE-ANESTHESIA EVALUATION ADULT - NS MD HP INPLANTS MED DEV
Brief Postoperative Note      Patient: Nuno Hardy  YOB: 1944  MRN: [id number]    Date of Procedure: 8/12/2020    Pre-Op Diagnosis: Age-related nuclear cataract of left eye [H25.12]    Post-Op Diagnosis: Same       Procedure(s):  PHACOEMULSIFICATION OF CATARACT LEFT EYE WITH INTRAOCULAR LENS IMPLANT    Surgeon(s):  Jacky Lauren MD    Assistant:  * No surgical staff found *    Anesthesia: Monitor Anesthesia Care    Estimated Blood Loss (mL): Minimal    Complications: None    Specimens:   * No specimens in log *    Implants:  Implant Name Type Inv.  Item Serial No.  Lot No. LRB No. Used Action   LENS IOL Meganwahaydea Chris - [id number] [employer] [address] [facility] [id number] SUNITHA  Left 1 Implanted         Drains: * No LDAs found *    Findings: none    Electronically signed by Neda Beckwith MD on 8/12/2020 at 9:42 AM
None

## 2023-01-13 ENCOUNTER — TRANSCRIPTION ENCOUNTER (OUTPATIENT)
Age: 33
End: 2023-01-13

## 2023-01-13 LAB
BASOPHILS # BLD AUTO: 0.03 K/UL — SIGNIFICANT CHANGE UP (ref 0–0.2)
BASOPHILS NFR BLD AUTO: 0.2 % — SIGNIFICANT CHANGE UP (ref 0–2)
EOSINOPHIL # BLD AUTO: 0.01 K/UL — SIGNIFICANT CHANGE UP (ref 0–0.5)
EOSINOPHIL NFR BLD AUTO: 0.1 % — SIGNIFICANT CHANGE UP (ref 0–6)
HCT VFR BLD CALC: 33.1 % — LOW (ref 34.5–45)
HGB BLD-MCNC: 10.9 G/DL — LOW (ref 11.5–15.5)
IMM GRANULOCYTES NFR BLD AUTO: 0.6 % — SIGNIFICANT CHANGE UP (ref 0–0.9)
LYMPHOCYTES # BLD AUTO: 17.4 % — SIGNIFICANT CHANGE UP (ref 13–44)
LYMPHOCYTES # BLD AUTO: 2.47 K/UL — SIGNIFICANT CHANGE UP (ref 1–3.3)
MCHC RBC-ENTMCNC: 31 PG — SIGNIFICANT CHANGE UP (ref 27–34)
MCHC RBC-ENTMCNC: 32.9 GM/DL — SIGNIFICANT CHANGE UP (ref 32–36)
MCV RBC AUTO: 94 FL — SIGNIFICANT CHANGE UP (ref 80–100)
MONOCYTES # BLD AUTO: 0.53 K/UL — SIGNIFICANT CHANGE UP (ref 0–0.9)
MONOCYTES NFR BLD AUTO: 3.7 % — SIGNIFICANT CHANGE UP (ref 2–14)
NEUTROPHILS # BLD AUTO: 11.06 K/UL — HIGH (ref 1.8–7.4)
NEUTROPHILS NFR BLD AUTO: 78 % — HIGH (ref 43–77)
NRBC # BLD: 0 /100 WBCS — SIGNIFICANT CHANGE UP (ref 0–0)
PLATELET # BLD AUTO: 195 K/UL — SIGNIFICANT CHANGE UP (ref 150–400)
RBC # BLD: 3.52 M/UL — LOW (ref 3.8–5.2)
RBC # FLD: 14.2 % — SIGNIFICANT CHANGE UP (ref 10.3–14.5)
WBC # BLD: 14.19 K/UL — HIGH (ref 3.8–10.5)
WBC # FLD AUTO: 14.19 K/UL — HIGH (ref 3.8–10.5)

## 2023-01-13 RX ORDER — POLYETHYLENE GLYCOL 3350 17 G/17G
17 POWDER, FOR SOLUTION ORAL EVERY 24 HOURS
Refills: 0 | Status: DISCONTINUED | OUTPATIENT
Start: 2023-01-13 | End: 2023-01-15

## 2023-01-13 RX ORDER — IBUPROFEN 200 MG
1 TABLET ORAL
Qty: 0 | Refills: 0 | DISCHARGE
Start: 2023-01-13

## 2023-01-13 RX ORDER — IBUPROFEN 200 MG
600 TABLET ORAL EVERY 6 HOURS
Refills: 0 | Status: DISCONTINUED | OUTPATIENT
Start: 2023-01-13 | End: 2023-01-15

## 2023-01-13 RX ORDER — ACETAMINOPHEN 500 MG
3 TABLET ORAL
Qty: 0 | Refills: 0 | DISCHARGE
Start: 2023-01-13

## 2023-01-13 RX ADMIN — Medication 30 MILLIGRAM(S): at 03:24

## 2023-01-13 RX ADMIN — Medication 975 MILLIGRAM(S): at 21:49

## 2023-01-13 RX ADMIN — Medication 30 MILLIGRAM(S): at 18:25

## 2023-01-13 RX ADMIN — MAGNESIUM HYDROXIDE 30 MILLILITER(S): 400 TABLET, CHEWABLE ORAL at 06:45

## 2023-01-13 RX ADMIN — Medication 975 MILLIGRAM(S): at 12:08

## 2023-01-13 RX ADMIN — Medication 975 MILLIGRAM(S): at 22:45

## 2023-01-13 RX ADMIN — Medication 30 MILLIGRAM(S): at 17:51

## 2023-01-13 RX ADMIN — Medication 30 MILLIGRAM(S): at 09:36

## 2023-01-13 RX ADMIN — Medication 30 MILLIGRAM(S): at 08:45

## 2023-01-13 RX ADMIN — SIMETHICONE 80 MILLIGRAM(S): 80 TABLET, CHEWABLE ORAL at 06:45

## 2023-01-13 RX ADMIN — Medication 975 MILLIGRAM(S): at 13:08

## 2023-01-13 RX ADMIN — Medication 975 MILLIGRAM(S): at 06:40

## 2023-01-13 RX ADMIN — Medication 30 MILLIGRAM(S): at 02:54

## 2023-01-13 RX ADMIN — POLYETHYLENE GLYCOL 3350 17 GRAM(S): 17 POWDER, FOR SOLUTION ORAL at 07:29

## 2023-01-13 RX ADMIN — Medication 975 MILLIGRAM(S): at 07:10

## 2023-01-13 NOTE — DISCHARGE NOTE OB - PATIENT PORTAL LINK FT
You can access the FollowMyHealth Patient Portal offered by [facility] by registering at the following website: [email or web address]. By joining [employer]’s FollowMyHealth portal, you will also be able to view your health information using other applications (apps) compatible with our system.

## 2023-01-13 NOTE — DISCHARGE NOTE OB - CARE PLAN
1 Principal Discharge DX:	Pregnancy  Assessment and plan of treatment:	 delivery, meeting all postoperative milestones.  Please follow-up with your OB doctor within 1-2 weeks.  You can resume a regular diet at home and may continue your prenatal vitamins as directed.  Please place nothing in the vagina for 6 weeks (no tampons, sex, douching, tub baths, swimming pools, etc).  If you have severe headaches and/or vision changes, heavy bleeding, or chest pain, please call your provider or go to the nearest Emergency Department.  Please call your OB with any signs of symptoms of infection including fever > 100.4 degrees, severe pain, malodorous vaginal discharge or heavy bleeding requiring more than 1-2 pads/hour.  You can take Motrin 600mg orally every 6 hours for pain as needed.

## 2023-01-13 NOTE — DISCHARGE NOTE OB - NS MD DC FALL RISK RISK
For information on Fall & Injury Prevention, visit: [email or web address] OR  [email or web address] OR  https://www.cdc.gov/steadi/patient.html

## 2023-01-13 NOTE — DISCHARGE NOTE OB - CARE PROVIDER_API CALL
Da Aguirre)  Obstetrics and Gynecology  [address], [facility], [place] [address]  Phone: [phone]  Fax: [phone]  Follow Up Time: 1 month

## 2023-01-13 NOTE — PROGRESS NOTE ADULT - ASSESSMENT
A/P   32y  s/p  section, POD #1, stable  -  Pain: PO motrin q6h, Tylenol q6h, oxycodone for severe pain PRN  -  Post-operatively labs: post-op Hgb 11.9  -  GI: tolerating clears, passing gas, ADAT  -  : remove lovelace, f/u TOV  -  DVT prophylaxis: ambulation, SCDs, Lovenox  -  Dispo: POD 3 or 4

## 2023-01-13 NOTE — DISCHARGE NOTE OB - NS OB DC TDAP REASON NOT RECEIVED
Gail Jem called and requested to have some test ordered due to being in the hospital a few weeks ago. He is requesting sputum culture, AFB and a Bronchoscopy. Please order if you feel these are appropriate. Contraindicated

## 2023-01-13 NOTE — DISCHARGE NOTE OB - MEDICATION SUMMARY - MEDICATIONS TO TAKE
I will START or STAY ON the medications listed below when I get home from the hospital:    acetaminophen 325 mg oral tablet  -- 3 tab(s) by mouth every 6 hours  -- Indication: For PAIN    ibuprofen 600 mg oral tablet  -- 1 tab(s) by mouth every 6 hours  -- Indication: For PAIN    Prenatal Multivitamins oral tablet  -- 1 cap(s) by mouth once a day  -- Indication: For PNV

## 2023-01-14 RX ADMIN — Medication 975 MILLIGRAM(S): at 21:40

## 2023-01-14 RX ADMIN — Medication 600 MILLIGRAM(S): at 13:38

## 2023-01-14 RX ADMIN — Medication 600 MILLIGRAM(S): at 12:38

## 2023-01-14 RX ADMIN — Medication 600 MILLIGRAM(S): at 06:15

## 2023-01-14 RX ADMIN — Medication 975 MILLIGRAM(S): at 20:51

## 2023-01-14 RX ADMIN — Medication 600 MILLIGRAM(S): at 05:14

## 2023-01-14 RX ADMIN — POLYETHYLENE GLYCOL 3350 17 GRAM(S): 17 POWDER, FOR SOLUTION ORAL at 07:23

## 2023-01-14 RX ADMIN — SIMETHICONE 80 MILLIGRAM(S): 80 TABLET, CHEWABLE ORAL at 05:19

## 2023-01-14 RX ADMIN — Medication 600 MILLIGRAM(S): at 18:36

## 2023-01-14 NOTE — PROGRESS NOTE ADULT - ATTENDING COMMENTS
Pt is a now POD#1 s/p uncomplicated repeat c/s.  Patient reports that she feels well. She is ambulating without issue, voiding spontaneously, and tolerating regular diet. She has not yet passed gas. She denies fevers, chills, SOB, sore throat, cough, abdominal pain or any other complaints.     Vitals reviewed and are within normal limits. Appropriate physical exam- abdomen is soft and appropriately tender with firm fundus below the umbilicus. Lochia is mild. Incision id c/d/i    Continue with routine postoperative care.     Patient is in agreement with the plan and has no additional needs or questions at this time.
Pt is POD#2 s/p uncomplicated repeat c/s. Patient reports that she feels well. She is ambulating without issue, voiding spontaneously, passing gas and tolerating regular diet. She denies fevers, chills, SOB, sore throat, cough, abdominal pain or any other complaints.     Vitals reviewed and are within normal limits. Appropriate physical exam- abdomen is soft and appropriately tender with firm fundus below the umbilicus. Lochia is mild.      Patient stable for discharge with 2 week follow up. She is in agreement with the plan and has no additional needs or questions at this time.

## 2023-01-14 NOTE — PROGRESS NOTE ADULT - ASSESSMENT
32y Female POD#2 s/p rC/S, Uncomplicated                                       1. Neuro/Pain:  OPM  2  CV:  VS per routine  3. Pulm: Encourage ISS & Ambulation  4. GI:  Reg  5. : Voiding  6. DVT ppx: SCDs, lovenox 40mg qd  7. Dispo: POD #3 or #4

## 2023-01-15 VITALS
SYSTOLIC BLOOD PRESSURE: 106 MMHG | HEART RATE: 68 BPM | RESPIRATION RATE: 17 BRPM | OXYGEN SATURATION: 97 % | TEMPERATURE: 97 F | DIASTOLIC BLOOD PRESSURE: 70 MMHG

## 2023-01-15 PROCEDURE — 87635 SARS-COV-2 COVID-19 AMP PRB: CPT

## 2023-01-15 PROCEDURE — 86769 SARS-COV-2 COVID-19 ANTIBODY: CPT

## 2023-01-15 PROCEDURE — 90707 MMR VACCINE SC: CPT

## 2023-01-15 PROCEDURE — 85025 COMPLETE CBC W/AUTO DIFF WBC: CPT

## 2023-01-15 PROCEDURE — 86780 TREPONEMA PALLIDUM: CPT

## 2023-01-15 PROCEDURE — 36415 COLL VENOUS BLD VENIPUNCTURE: CPT

## 2023-01-15 PROCEDURE — 86850 RBC ANTIBODY SCREEN: CPT

## 2023-01-15 PROCEDURE — 59050 FETAL MONITOR W/REPORT: CPT

## 2023-01-15 PROCEDURE — 86900 BLOOD TYPING SEROLOGIC ABO: CPT

## 2023-01-15 PROCEDURE — 86901 BLOOD TYPING SEROLOGIC RH(D): CPT

## 2023-01-15 RX ADMIN — Medication 0.5 MILLILITER(S): at 09:32

## 2023-01-15 RX ADMIN — Medication 600 MILLIGRAM(S): at 07:47

## 2023-01-15 RX ADMIN — Medication 975 MILLIGRAM(S): at 08:57

## 2023-01-15 RX ADMIN — POLYETHYLENE GLYCOL 3350 17 GRAM(S): 17 POWDER, FOR SOLUTION ORAL at 07:16

## 2023-01-15 RX ADMIN — Medication 600 MILLIGRAM(S): at 07:15

## 2023-01-15 RX ADMIN — Medication 975 MILLIGRAM(S): at 03:48

## 2023-01-15 RX ADMIN — Medication 975 MILLIGRAM(S): at 04:45

## 2023-01-15 NOTE — PROGRESS NOTE ADULT - SUBJECTIVE AND OBJECTIVE BOX
Patient evaluated at bedside this morning, resting comfortable in bed.   She reports pain is well controlled.  She denies headache, dizziness, chest pain, palpitations, shortness of breathe, nausea, vomiting or heavy vaginal bleeding.  She has been ambulating without assistance, voiding spontaneously, passing gas, tolerating regular diet and is breastfeeding.    Physical Exam:  Vital Signs Last 24 Hrs  T(C): 36.7 (15 Yassine 2023 05:15), Max: 36.8 (14 Jan 2023 22:00)  T(F): 98.1 (15 Yassine 2023 05:15), Max: 98.2 (14 Jan 2023 22:00)  HR: 67 (15 Yassine 2023 05:15) (65 - 70)  BP: 106/72 (15 Yassine 2023 05:15) (104/72 - 111/70)  BP(mean): --  RR: 16 (15 Yassine 2023 05:15) (16 - 18)  SpO2: 96% (15 Yassine 2023 05:15) (96% - 97%)    Parameters below as of 14 Jan 2023 22:00  Patient On (Oxygen Delivery Method): room air        GA: NAD, A+0 x 3  Abd: + BS, soft, nontender, nondistended, no rebound or guarding, incision clean, dry and intact, uterus firm at midline and below umbilicus  : lochia WNL  Extremities: no swelling or calf tenderness                   
Patient evaluated at bedside this morning, resting comfortable in bed, with no acute events overnight.  She reports pain is well controlled.  She denies headache, dizziness, chest pain, palpitations, shortness of breathe, nausea, vomiting or heavy vaginal bleeding.  She has not tried ambulating since procedure, lovelace will be removed at this time. Tolerating clear liquids.     Physical Exam:  Vital Signs Last 24 Hrs  T(C): 36.4 (13 Jan 2023 06:15), Max: 36.9 (13 Jan 2023 00:00)  T(F): 97.5 (13 Jan 2023 06:15), Max: 98.4 (13 Jan 2023 00:00)  HR: 56 (13 Jan 2023 06:15) (56 - 77)  BP: 91/56 (13 Jan 2023 06:15) (87/53 - 96/57)  BP(mean): --  RR: 17 (13 Jan 2023 06:15) (14 - 18)  SpO2: 98% (13 Jan 2023 06:15) (97% - 100%)    Parameters below as of 13 Jan 2023 00:00  Patient On (Oxygen Delivery Method): room air      GA: NAD, A+0 x 3  Abd: + BS, soft, nontender, nondistended, no rebound or guarding, incision clean, dry and intact, uterus firm at midline,  below umbilicus  :  lochia WNL  Extremities: no swelling or calf tenderness, reflexes +2 bilaterally.                          11.9   11.59 )-----------( 205      ( 12 Jan 2023 05:34 )             35.9       acetaminophen     Tablet .. 975 milliGRAM(s) Oral <User Schedule>  diphenhydrAMINE 25 milliGRAM(s) Oral every 6 hours PRN  diphtheria/tetanus/pertussis (acellular) Vaccine (Adacel) 0.5 milliLiter(s) IntraMuscular once  ibuprofen  Tablet. 600 milliGRAM(s) Oral every 6 hours  ketorolac   Injectable 30 milliGRAM(s) IV Push every 6 hours  lactated ringers. 1000 milliLiter(s) IV Continuous <Continuous>  lanolin Ointment 1 Application(s) Topical every 6 hours PRN  magnesium hydroxide Suspension 30 milliLiter(s) Oral two times a day PRN  oxyCODONE    IR 5 milliGRAM(s) Oral every 3 hours PRN  oxyCODONE    IR 5 milliGRAM(s) Oral once PRN  oxytocin Infusion 333.333 milliUNIT(s)/Min IV Continuous <Continuous>  simethicone 80 milliGRAM(s) Chew every 4 hours PRN  
Patient evaluated at bedside.   She reports pain is well controlled with OPM.  She has been ambulating without assistance, voiding spontaneously, passing gas, tolerating regular diet and is breastfeeding.  She denies HA, dizziness, CP, palpitations, SOB, n/v, or heavy vaginal bleeding.    Physical Exam:  Vital Signs Last 24 Hrs  T(C): 36.5 (13 Jan 2023 22:15), Max: 36.8 (13 Jan 2023 14:46)  T(F): 97.7 (13 Jan 2023 22:15), Max: 98.2 (13 Jan 2023 14:46)  HR: 66 (13 Jan 2023 22:15) (56 - 93)  BP: 97/67 (13 Jan 2023 22:15) (91/56 - 106/62)  BP(mean): --  RR: 16 (13 Jan 2023 22:15) (16 - 18)  SpO2: 97% (13 Jan 2023 22:15) (96% - 99%)    Parameters below as of 13 Jan 2023 22:15  Patient On (Oxygen Delivery Method): room air    Gen: NAD  Abd: + BS, soft, nontender, nondistended, no rebound or guarding  Incision clean, dry and intact  uterus firm at midline below the umbilicus  : lochia WNL  Extremities: no calf tenderness                          10.9   14.19 )-----------( 195      ( 13 Jan 2023 05:30 )             33.1

## 2023-01-15 NOTE — PROGRESS NOTE ADULT - ASSESSMENT
A/P: 32y s/p  section, POD#3, stable  -  Pain: PO motrin q6hrs, tylenol q8hrs, oxycodone for severe pain PRN  -  Post-operatively labs: Hemodynamically stable, no symptoms of anemia   -  GI: tolerating regular diet, passing gas  -  : s/p lovelace , urinating without difficulty  -  DVT prophylaxis: encouraged increased ambulation, SCDs, Lovenox  -  Dispo: Today, unless mentioned otherwise.

## 2023-01-19 DIAGNOSIS — Z28.09 IMMUNIZATION NOT CARRIED OUT BECAUSE OF OTHER CONTRAINDICATION: ICD-10-CM

## 2023-01-19 DIAGNOSIS — O34.219 MATERNAL CARE FOR UNSPECIFIED TYPE SCAR FROM PREVIOUS CESAREAN DELIVERY: ICD-10-CM

## 2023-01-19 DIAGNOSIS — Z3A.39 39 WEEKS GESTATION OF PREGNANCY: ICD-10-CM

## 2023-01-19 DIAGNOSIS — O34.211 MATERNAL CARE FOR LOW TRANSVERSE SCAR FROM PREVIOUS CESAREAN DELIVERY: ICD-10-CM

## 2023-01-19 DIAGNOSIS — Z23 ENCOUNTER FOR IMMUNIZATION: ICD-10-CM

## 2023-01-19 DIAGNOSIS — Z14.8 GENETIC CARRIER OF OTHER DISEASE: ICD-10-CM

## 2023-01-19 DIAGNOSIS — Z85.828 PERSONAL HISTORY OF OTHER MALIGNANT NEOPLASM OF SKIN: ICD-10-CM

## 2023-01-22 ENCOUNTER — NON-APPOINTMENT (OUTPATIENT)
Age: 33
End: 2023-01-22

## 2023-01-26 ENCOUNTER — APPOINTMENT (OUTPATIENT)
Dept: OBGYN | Facility: CLINIC | Age: 33
End: 2023-01-26
Payer: COMMERCIAL

## 2023-01-26 VITALS
HEART RATE: 97 BPM | DIASTOLIC BLOOD PRESSURE: 73 MMHG | WEIGHT: 183 LBS | OXYGEN SATURATION: 98 % | SYSTOLIC BLOOD PRESSURE: 105 MMHG | BODY MASS INDEX: 27.02 KG/M2

## 2023-01-26 DIAGNOSIS — Z34.90 ENCOUNTER FOR SUPERVISION OF NORMAL PREGNANCY, UNSPECIFIED, UNSPECIFIED TRIMESTER: ICD-10-CM

## 2023-01-26 PROCEDURE — 0503F POSTPARTUM CARE VISIT: CPT

## 2023-01-26 NOTE — HISTORY OF PRESENT ILLNESS
[Last Pap Date: ___] : Last Pap Date: [unfilled] [Delivery Date: ___] : on [unfilled] [Repeat C/S] : delivered by  section (repeat) [Male] : Delivery History: baby boy [Abdominal Pain] : no abdominal pain [Back Pain] : no back pain [Chest Pain] : no chest pain [Chills] : no chills [Fatigue] : no fatigue [Dysuria] : no dysuria [Fever] : no fever [Headache] : no headache [Nausea] : no nausea [Vomiting] : no vomiting [Postpartum Follow Up] : postpartum follow up [Complications:___] : no complications [Primary C/S] : delivered by  section [Breastfeeding] : not currently nursing [S/Sx PP Depression] : no signs/symptoms of postpartum depression [Clean/Dry/Intact] : clean, dry and intact [Erythema] : not erythematous [Back to Normal] : is back to normal in size [Mild] : mild vaginal bleeding [Normal] : the vagina was normal [Cervix Sample Taken] : cervical sample not taken for a Pap smear [Not Done] : Examination of breasts not done [Doing Well] : is doing well [No Sign of Infection] : is showing no signs of infection [Excellent Pain Control] : has excellent pain control [None] : None

## 2023-02-21 ENCOUNTER — APPOINTMENT (OUTPATIENT)
Dept: OBGYN | Facility: CLINIC | Age: 33
End: 2023-02-21
Payer: COMMERCIAL

## 2023-02-21 VITALS
BODY MASS INDEX: 26.88 KG/M2 | WEIGHT: 182 LBS | DIASTOLIC BLOOD PRESSURE: 80 MMHG | HEART RATE: 80 BPM | SYSTOLIC BLOOD PRESSURE: 110 MMHG | OXYGEN SATURATION: 97 %

## 2023-02-21 PROCEDURE — 0503F POSTPARTUM CARE VISIT: CPT

## 2023-02-21 NOTE — HISTORY OF PRESENT ILLNESS
[Last Pap Date: ___] : Last Pap Date: [unfilled] [Delivery Date: ___] : on [unfilled] [Repeat C/S] : delivered by  section (repeat) [Male] : Delivery History: baby boy [Postpartum Follow Up] : postpartum follow up [Complications:___] : no complications [] : delivered by vaginal delivery [S/Sx PP Depression] : no signs/symptoms of postpartum depression [Erythema] : not erythematous [Back to Normal] : is back to normal in size [Mild] : mild vaginal bleeding [Normal] : the vagina was normal [Cervix Sample Taken] : cervical sample not taken for a Pap smear [Not Done] : Examination of breasts not done [Doing Well] : is doing well [No Sign of Infection] : is showing no signs of infection [Excellent Pain Control] : has excellent pain control [None] : None [Breastfeeding] : not currently nursing [FreeTextEntry8] : Follow up six weeks post partum exam.

## 2023-02-27 LAB — CYTOLOGY CVX/VAG DOC THIN PREP: NORMAL

## 2023-03-21 ENCOUNTER — APPOINTMENT (OUTPATIENT)
Dept: PLASTIC SURGERY | Facility: CLINIC | Age: 33
End: 2023-03-21
Payer: SELF-PAY

## 2023-03-21 VITALS — WEIGHT: 180.78 LBS | HEIGHT: 69 IN | BODY MASS INDEX: 26.78 KG/M2

## 2023-03-21 DIAGNOSIS — Z41.1 ENCOUNTER FOR COSMETIC SURGERY: ICD-10-CM

## 2023-03-21 DIAGNOSIS — Z98.82 BREAST IMPLANT STATUS: ICD-10-CM

## 2023-03-21 PROCEDURE — 99499 UNLISTED E&M SERVICE: CPT

## 2023-03-26 PROBLEM — Z41.1 ELECTIVE PROCEDURE FOR UNACCEPTABLE COSMETIC APPEARANCE: Status: ACTIVE | Noted: 2023-03-26

## 2023-03-26 NOTE — ASSESSMENT
[FreeTextEntry1] : Debbie has a painful left breast implant that is 14 years old. Unclear if it is siliocne or saline. Will order MRI to evaluate. She will need right breast reduction / mastopexy and correction of tethered scar to improve the shape and symmetry with the left. This will exacerbate the malposition of the  NAC which is higher than the let breast. I recommend left mastopexy for symmetry along with with implant exchange for slightly renee implant and reduction of right areola - this will allow us to also remove the remote port which is causing her pain. \par \par MRI ordered to evaluate pain left breast implant, r/o rupture\par she will let us know what implant she currently has - try to obtain records from [facility]. \par she had a baby 9 weeks ago, advised to wait 6 months postpartum to proceed . F/U in 3 months

## 2023-03-26 NOTE — HISTORY OF PRESENT ILLNESS
[FreeTextEntry1] : 33 y/o female presents for initial consultation for breast implant revision. 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 Barlow in [facility] for asymmetry.  Unsure of saline vs implant - possible dual chamber the implant has a remote tunneled valve that is causing her pain and discomfort in let axilla. Patient has 2 c-sections in 2020 & 2023. She states she has increased asymmetry now. She is not breastfeeding. No family history of breast or ovarian cancer. \par \par No personal or family history of bleeding/clotting disorder.

## 2023-03-26 NOTE — PHYSICAL EXAM
[de-identified] : right breast well healed lollipop incision slight clothes line deformity inferior pole contour irregularity.  left breast well healed IMF incision, left breast implant in place, no nipple retraction, R>L , left TTP overlying port, NAC higher on right

## 2023-04-20 ENCOUNTER — OUTPATIENT (OUTPATIENT)
Dept: OUTPATIENT SERVICES | Facility: HOSPITAL | Age: 33
LOS: 1 days | End: 2023-04-20

## 2023-04-20 ENCOUNTER — APPOINTMENT (OUTPATIENT)
Dept: ULTRASOUND IMAGING | Facility: CLINIC | Age: 33
End: 2023-04-20
Payer: COMMERCIAL

## 2023-04-20 ENCOUNTER — RESULT REVIEW (OUTPATIENT)
Age: 33
End: 2023-04-20

## 2023-04-20 PROCEDURE — 76641 ULTRASOUND BREAST COMPLETE: CPT | Mod: 26,LT

## 2023-04-25 ENCOUNTER — APPOINTMENT (OUTPATIENT)
Dept: PLASTIC SURGERY | Facility: CLINIC | Age: 33
End: 2023-04-25
Payer: COMMERCIAL

## 2023-04-25 VITALS — HEIGHT: 69 IN | BODY MASS INDEX: 26.07 KG/M2 | WEIGHT: 176 LBS

## 2023-04-25 DIAGNOSIS — T85.43XA LEAKAGE OF BREAST PROSTHESIS AND IMPLANT, INITIAL ENCOUNTER: ICD-10-CM

## 2023-04-25 PROCEDURE — 99213 OFFICE O/P EST LOW 20 MIN: CPT

## 2023-04-25 NOTE — PHYSICAL EXAM
[de-identified] : right breast well healed lollipop incision slight clothes line deformity inferior pole contour irregularity.  left breast well healed IMF incision, left breast implant in place, no nipple retraction, R>L , left TTP overlying port, NAC higher on right  [de-identified] : soft, NT, ND, + adiposity and mild skin laxity, no hernias, Well healed Pfannenstiel incision

## 2023-04-25 NOTE — HISTORY OF PRESENT ILLNESS
[FreeTextEntry1] : 33 y/o female presents for follow up regarding breast implant rupture / revision. Left breast silicone implant rupture seen on US - 4/20/23. 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 Barlow in [facility] for asymmetry.  .\par Current implant: 375 cc implant \par \par She would now like to consider left breast implant removal without replacement and fat grafting instead

## 2023-04-25 NOTE — ASSESSMENT
[FreeTextEntry1] : Debbie has a painful left breast implant that is 14 years old. Left breast silicone implant ruptured seen on US 4/20/23\par Current implant: silicone 375 cc\par She will need right breast reduction / mastopexy and correction of tethered scar to improve the shape and symmetry with the left. This will exacerbate the malposition of the  NAC which is higher than the left breast. I recommend left mastopexy for symmetry along with with implant exchange vs implant removal and fat grafting. Removing the implant will allow us to also remove the remote port which is causing her pain. We also discussed removing the left implant and replacing with fat grafting w/ liposuction for abdomen and hips. We also discussed mini abdominoplasty She is still deciding which option and will let us know. Discussed the risks benefits and alternatives. I explained that we would perform suction lipectomy from a suitable donor site such as the abdomen or thighs and then process the fat for grafting and inject it back into the affected breast in order to fill the loss of volume and correct the deformity. I expect no more than 50% take of the fat graft but it could be less. The risks include residual asymmetry or uncorrected deformity as well as the risk that the fat absorbs with time, that she needs revision or repeat grafting. there is also a risk of donor site deformity where we harvest the fat from including contour irregularities and scarring. we also discussed the risks of fat necrosis of the injected, grafted fat and calcifications on breast imaging studies leading to false positive and even unnecessary biopsies.  The surgery is approximately 2.0 hours as an outpatient and the recovery is approximately 1 week - avoiding any strenuous physical activity and out of work.\par \par \par Advised to wait 4-6 months postpartum to proceed

## 2023-05-27 ENCOUNTER — NON-APPOINTMENT (OUTPATIENT)
Age: 33
End: 2023-05-27

## 2023-06-11 ENCOUNTER — NON-APPOINTMENT (OUTPATIENT)
Age: 33
End: 2023-06-11

## 2023-07-17 NOTE — DISCHARGE NOTE OB - CARE PROVIDERS DIRECT ADDRESSES
,[email or web address]
, lives with mother  no alcohol or smoking  works as PCA at [employer]  ambulate without assist

## 2023-08-28 ENCOUNTER — APPOINTMENT (OUTPATIENT)
Dept: OBGYN | Facility: CLINIC | Age: 33
End: 2023-08-28
Payer: COMMERCIAL

## 2023-08-28 VITALS
WEIGHT: 167 LBS | OXYGEN SATURATION: 97 % | BODY MASS INDEX: 24.66 KG/M2 | SYSTOLIC BLOOD PRESSURE: 109 MMHG | DIASTOLIC BLOOD PRESSURE: 76 MMHG | HEART RATE: 81 BPM

## 2023-08-28 DIAGNOSIS — Z01.419 ENCOUNTER FOR GYNECOLOGICAL EXAMINATION (GENERAL) (ROUTINE) W/OUT ABNORMAL FINDINGS: ICD-10-CM

## 2023-08-28 PROCEDURE — 99395 PREV VISIT EST AGE 18-39: CPT

## 2023-08-28 NOTE — HISTORY OF PRESENT ILLNESS
[Patient reported PAP Smear was normal] : Patient reported PAP Smear was normal [No] : Patient does not have concerns regarding sex [Currently Active] : currently active [Men] : men [PapSmeardate] : 02/2023 [FreeTextEntry1] : 08/24/2023

## 2023-08-31 LAB — CYTOLOGY CVX/VAG DOC THIN PREP: NORMAL

## 2024-09-03 ENCOUNTER — APPOINTMENT (OUTPATIENT)
Dept: OBGYN | Facility: CLINIC | Age: 34
End: 2024-09-03
Payer: COMMERCIAL

## 2024-09-03 VITALS
DIASTOLIC BLOOD PRESSURE: 69 MMHG | BODY MASS INDEX: 23.48 KG/M2 | SYSTOLIC BLOOD PRESSURE: 104 MMHG | HEART RATE: 79 BPM | OXYGEN SATURATION: 98 % | WEIGHT: 159 LBS

## 2024-09-03 DIAGNOSIS — Z00.00 ENCOUNTER FOR GENERAL ADULT MEDICAL EXAMINATION W/OUT ABNORMAL FINDINGS: ICD-10-CM

## 2024-09-03 DIAGNOSIS — Z01.419 ENCOUNTER FOR GYNECOLOGICAL EXAMINATION (GENERAL) (ROUTINE) W/OUT ABNORMAL FINDINGS: ICD-10-CM

## 2024-09-03 PROCEDURE — 99395 PREV VISIT EST AGE 18-39: CPT

## 2024-09-03 NOTE — HISTORY OF PRESENT ILLNESS
[N] : Patient does not use contraception [Y] : Positive pregnancy history [Normal Amount/Duration] :  normal amount and duration [Frequency: Q ___ days] : menstrual periods occur approximately every [unfilled] days [Menstrual Cramps] : menstrual cramps [Currently Active] : currently active [Men] : men [No] : No [Patient refuses STI testing] : Patient refuses STI testing [PapSmeardate] : 8/28/2023 [LMPDate] : 8/22/2024 [MensesFreq] : 28 [MensesLength] : 7 [PGHxTotal] : 2 [[facility] Center[facility]Living] : 2 [FreeTextEntry1] : 8/22/2024

## 2024-09-09 ENCOUNTER — TRANSCRIPTION ENCOUNTER (OUTPATIENT)
Age: 34
End: 2024-09-09

## 2024-09-09 ENCOUNTER — NON-APPOINTMENT (OUTPATIENT)
Age: 34
End: 2024-09-09

## 2024-09-09 LAB — CYTOLOGY CVX/VAG DOC THIN PREP: NORMAL
